# Patient Record
Sex: FEMALE
[De-identification: names, ages, dates, MRNs, and addresses within clinical notes are randomized per-mention and may not be internally consistent; named-entity substitution may affect disease eponyms.]

---

## 2018-04-09 ENCOUNTER — HOSPITAL ENCOUNTER (OUTPATIENT)
Dept: HOSPITAL 42 - OPSURG | Age: 68
Discharge: HOME | End: 2018-04-09
Attending: SURGERY
Payer: MEDICARE

## 2018-04-09 VITALS — HEART RATE: 68 BPM | SYSTOLIC BLOOD PRESSURE: 133 MMHG | TEMPERATURE: 98 F | DIASTOLIC BLOOD PRESSURE: 76 MMHG

## 2018-04-09 VITALS — BODY MASS INDEX: 38.4 KG/M2

## 2018-04-09 VITALS — OXYGEN SATURATION: 96 %

## 2018-04-09 VITALS — RESPIRATION RATE: 18 BRPM

## 2018-04-09 DIAGNOSIS — N63.0: ICD-10-CM

## 2018-04-09 DIAGNOSIS — N64.4: ICD-10-CM

## 2018-04-09 DIAGNOSIS — N61.0: Primary | ICD-10-CM

## 2018-04-09 NOTE — PCM.[email or web address]
Surgeon's Initial Post Op Note





- Surgeon's Notes


Surgeon: Dr. Palacio 


Assistant: Lillian Tobin PGY2


Type of Anesthesia: Local


Pre-Operative Diagnosis: R breast lesion


Operative Findings: R breast lesion [id number]


Post-Operative Diagnosis: Same


Operation Performed: R breast biopsy : truecut


Specimen/Specimens Removed: Truecut R breast biopsy x5


Estimated Blood Loss: EBL {In ML}: 5


Blood Products Given: N/A


Drains Used: No Drains


Post-Op Condition: Good


Date of Surgery/Procedure: 04/09/18


Time of Surgery/Procedure: 13:39

## 2018-04-23 NOTE — OP
PROCEDURE DATE:  04/09/2018



SURGEON:  Dr. Johnson.



DESCRIPTION OF PROCEDURE:  In the operating room, the patient was

identified by name, name of the procedure, laterality, and my ulices.  The

breast was prepped with chlorhexidine and waiting for 3 minutes, it was

then draped.  After the successful timeout, the area was examined.  Small

incision was made with an 11-blade and TruCut needle.  Three passes were

used to biopsy this palpable mass.  This was sent to the lab, not for

frozen section.  The incision was closed with a Vicryl and a pressure

dressing applied.  Patient was taken to the recovery room in good condition

after the sponge and needle count was declared correct.







__________________________________________

Shahriar Johnson MD



DD:  04/22/2018 13:31:19

DT:  04/22/2018 21:59:23

Job # [id number]